# Patient Record
Sex: FEMALE | Race: BLACK OR AFRICAN AMERICAN
[De-identification: names, ages, dates, MRNs, and addresses within clinical notes are randomized per-mention and may not be internally consistent; named-entity substitution may affect disease eponyms.]

---

## 2019-02-01 ENCOUNTER — TRANSCRIPTION ENCOUNTER (OUTPATIENT)
Age: 34
End: 2019-02-01

## 2020-11-04 ENCOUNTER — TRANSCRIPTION ENCOUNTER (OUTPATIENT)
Age: 35
End: 2020-11-04

## 2021-04-14 ENCOUNTER — NON-APPOINTMENT (OUTPATIENT)
Age: 36
End: 2021-04-14

## 2021-04-14 ENCOUNTER — LABORATORY RESULT (OUTPATIENT)
Age: 36
End: 2021-04-14

## 2021-04-14 ENCOUNTER — APPOINTMENT (OUTPATIENT)
Dept: DERMATOLOGY | Facility: CLINIC | Age: 36
End: 2021-04-14
Payer: COMMERCIAL

## 2021-04-14 VITALS — WEIGHT: 147 LBS | BODY MASS INDEX: 21.77 KG/M2 | HEIGHT: 69 IN

## 2021-04-14 DIAGNOSIS — D48.7 NEOPLASM OF UNCERTAIN BEHAVIOR OF OTHER SPECIFIED SITES: ICD-10-CM

## 2021-04-14 DIAGNOSIS — D48.5 NEOPLASM OF UNCERTAIN BEHAVIOR OF SKIN: ICD-10-CM

## 2021-04-14 PROBLEM — Z00.00 ENCOUNTER FOR PREVENTIVE HEALTH EXAMINATION: Status: ACTIVE | Noted: 2021-04-14

## 2021-04-14 PROCEDURE — 99204 OFFICE O/P NEW MOD 45 MIN: CPT | Mod: 25

## 2021-04-14 PROCEDURE — 11104 PUNCH BX SKIN SINGLE LESION: CPT

## 2021-04-14 PROCEDURE — 11105 PUNCH BX SKIN EA SEP/ADDL: CPT

## 2021-04-14 PROCEDURE — 99072 ADDL SUPL MATRL&STAF TM PHE: CPT

## 2021-04-15 ENCOUNTER — TRANSCRIPTION ENCOUNTER (OUTPATIENT)
Age: 36
End: 2021-04-15

## 2021-04-20 ENCOUNTER — NON-APPOINTMENT (OUTPATIENT)
Age: 36
End: 2021-04-20

## 2021-04-20 RX ORDER — KETOCONAZOLE 20.5 MG/ML
2 SHAMPOO, SUSPENSION TOPICAL
Qty: 1 | Refills: 10 | Status: ACTIVE | COMMUNITY
Start: 2021-04-20 | End: 1900-01-01

## 2021-04-20 RX ORDER — KETOCONAZOLE 20 MG/G
2 CREAM TOPICAL
Qty: 1 | Refills: 11 | Status: ACTIVE | COMMUNITY
Start: 2021-04-20 | End: 1900-01-01

## 2021-04-28 ENCOUNTER — APPOINTMENT (OUTPATIENT)
Dept: DERMATOLOGY | Facility: CLINIC | Age: 36
End: 2021-04-28
Payer: COMMERCIAL

## 2021-04-28 PROCEDURE — 99072 ADDL SUPL MATRL&STAF TM PHE: CPT

## 2021-04-28 PROCEDURE — 99214 OFFICE O/P EST MOD 30 MIN: CPT

## 2021-04-28 NOTE — PHYSICAL EXAM
[FreeTextEntry3] : AAOx3, pleasant, NAD, no visual lymphadenopathy\par hair, scalp, face, nose, eyelids, ears, lips, oropharynx, neck, chest, abdomen, back, right arm, left arm, nails, and hands examined with all normal findings,\par pertinent findings include:\par \par dark macules on face, shoulders, and back; significant \par no active areas of erythema

## 2021-04-28 NOTE — ASSESSMENT
[FreeTextEntry1] : spongiotic dermatitis\par bx proven\par sutures removed\par discussed results\par significant PIH; discussed prognosis and sunscreen\par if recurs; will do 2nd course of prednisone\par if recurrent after, consider patch testing and dupixent\par \par patient to call in 2 weeks with update

## 2021-04-28 NOTE — HISTORY OF PRESENT ILLNESS
[FreeTextEntry1] : f/u bx [de-identified] : 35 year old female here for f/u bx proven spongiotic derm. doing well with prednisone. was approved for dupixent.

## 2021-05-26 ENCOUNTER — NON-APPOINTMENT (OUTPATIENT)
Age: 36
End: 2021-05-26

## 2021-06-09 ENCOUNTER — TRANSCRIPTION ENCOUNTER (OUTPATIENT)
Age: 36
End: 2021-06-09

## 2021-06-09 ENCOUNTER — NON-APPOINTMENT (OUTPATIENT)
Age: 36
End: 2021-06-09

## 2021-06-23 ENCOUNTER — APPOINTMENT (OUTPATIENT)
Dept: DERMATOLOGY | Facility: CLINIC | Age: 36
End: 2021-06-23
Payer: COMMERCIAL

## 2021-06-23 VITALS — WEIGHT: 147 LBS | HEIGHT: 68 IN | BODY MASS INDEX: 22.28 KG/M2

## 2021-06-23 PROCEDURE — 99072 ADDL SUPL MATRL&STAF TM PHE: CPT

## 2021-06-23 PROCEDURE — 99214 OFFICE O/P EST MOD 30 MIN: CPT

## 2021-09-15 ENCOUNTER — TRANSCRIPTION ENCOUNTER (OUTPATIENT)
Age: 36
End: 2021-09-15

## 2021-10-25 ENCOUNTER — NON-APPOINTMENT (OUTPATIENT)
Age: 36
End: 2021-10-25

## 2021-10-25 RX ORDER — PREDNISONE 10 MG/1
10 TABLET ORAL
Qty: 20 | Refills: 0 | Status: ACTIVE | COMMUNITY
Start: 2021-10-25 | End: 1900-01-01

## 2022-01-03 ENCOUNTER — TRANSCRIPTION ENCOUNTER (OUTPATIENT)
Age: 37
End: 2022-01-03

## 2022-03-08 ENCOUNTER — TRANSCRIPTION ENCOUNTER (OUTPATIENT)
Age: 37
End: 2022-03-08

## 2022-09-21 ENCOUNTER — APPOINTMENT (OUTPATIENT)
Dept: DERMATOLOGY | Facility: CLINIC | Age: 37
End: 2022-09-21

## 2022-09-21 PROCEDURE — 99214 OFFICE O/P EST MOD 30 MIN: CPT

## 2022-09-21 RX ORDER — PREDNISONE 20 MG/1
20 TABLET ORAL
Qty: 20 | Refills: 0 | Status: ACTIVE | COMMUNITY
Start: 2022-09-21 | End: 1900-01-01

## 2022-10-11 ENCOUNTER — APPOINTMENT (OUTPATIENT)
Dept: ORTHOPEDIC SURGERY | Facility: CLINIC | Age: 37
End: 2022-10-11

## 2022-10-11 VITALS
BODY MASS INDEX: 24.4 KG/M2 | WEIGHT: 161 LBS | DIASTOLIC BLOOD PRESSURE: 94 MMHG | HEART RATE: 92 BPM | SYSTOLIC BLOOD PRESSURE: 137 MMHG | HEIGHT: 68 IN

## 2022-10-11 DIAGNOSIS — Z78.9 OTHER SPECIFIED HEALTH STATUS: ICD-10-CM

## 2022-10-11 DIAGNOSIS — M54.50 LOW BACK PAIN, UNSPECIFIED: ICD-10-CM

## 2022-10-11 DIAGNOSIS — Z82.49 FAMILY HISTORY OF ISCHEMIC HEART DISEASE AND OTHER DISEASES OF THE CIRCULATORY SYSTEM: ICD-10-CM

## 2022-10-11 DIAGNOSIS — Z83.3 FAMILY HISTORY OF DIABETES MELLITUS: ICD-10-CM

## 2022-10-11 DIAGNOSIS — M25.512 PAIN IN LEFT SHOULDER: ICD-10-CM

## 2022-10-11 PROCEDURE — 99203 OFFICE O/P NEW LOW 30 MIN: CPT

## 2022-10-11 PROCEDURE — 73030 X-RAY EXAM OF SHOULDER: CPT | Mod: LT

## 2022-10-11 PROCEDURE — 72100 X-RAY EXAM L-S SPINE 2/3 VWS: CPT

## 2022-10-11 PROCEDURE — 99072 ADDL SUPL MATRL&STAF TM PHE: CPT

## 2022-10-11 RX ORDER — CYCLOBENZAPRINE HYDROCHLORIDE 7.5 MG/1
TABLET, FILM COATED ORAL
Refills: 0 | Status: ACTIVE | COMMUNITY

## 2022-10-13 NOTE — PHYSICAL EXAM
[de-identified] : The patient has no respiratory distress. Mood and affect are normal. The patient is alert and oriented to person, place and time.\par Examination of the cervical spine demonstrates no tenderness, no deformity and no muscle spasm. Cervical spine rotation is 60° to the right, 60° to the left, 75° of extension and 45° of flexion. Neurologic exam of the upper extremities reveals intact sensation to light touch. Motor function is 5 over 5 in all groups. Deep tendon reflexes are 2+ and equal at the biceps, triceps and brachioradialis.\par Examination of the left shoulder demonstrates no deformity. The skin is intact. There is no erythema. There is tenderness anteriorly. Impingement sign is positive There is no instability. Drop arm test is negative. Empty can test is negative. Liftoff test is negative. Chester test is negative.  She has elevation of 150 degrees bilaterally.  She has external rotation of 50 degrees bilaterally and internal rotation to the upper lumbar level bilaterally.  The elbows are stable.  There is no lymphedema.\par Examination of the lumbar spine demonstrates tenderness of the midline. There is no muscle spasm. There is no deformity. Lumbar flexion is 90°, right lateral flexion 10° and left lateral flexion 10°. Straight leg raise test is negative. Lower extremity neurologic exam is intact with regard to sensation, motor function and deep tendon reflexes.  The calves are soft and nontender.  There is no lymphedema. [de-identified] : AP, transscapular and axillary x-rays of the left shoulder demonstrate no fracture, no dislocation and no bony abnormality.  Thoracic scoliosis is seen on the shoulder x-ray.\par AP and lateral x-rays of the lumbar spine demonstrate scoliosis.  There are no fractures or dislocations.
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
## 2022-10-13 NOTE — DISCUSSION/SUMMARY
[de-identified] : The patient has sustained a sprain to her left shoulder and to her lumbar spine.  I have discussed the pathology, natural history and treatment options with her.  She will take ibuprofen for pain.  She will work on home range of motion exercises and ice the affected areas.  If symptomatic in 3 weeks she will be reevaluated.

## 2022-10-20 ENCOUNTER — APPOINTMENT (OUTPATIENT)
Dept: DERMATOLOGY | Facility: CLINIC | Age: 37
End: 2022-10-20

## 2023-03-21 ENCOUNTER — NON-APPOINTMENT (OUTPATIENT)
Age: 38
End: 2023-03-21

## 2023-03-22 ENCOUNTER — APPOINTMENT (OUTPATIENT)
Dept: DERMATOLOGY | Facility: CLINIC | Age: 38
End: 2023-03-22

## 2023-03-22 PROCEDURE — XXXXX: CPT | Mod: 1L

## 2023-03-22 PROCEDURE — 99214 OFFICE O/P EST MOD 30 MIN: CPT | Mod: 1L

## 2023-03-22 RX ORDER — CLOBETASOL PROPIONATE 0.5 MG/G
0.05 OINTMENT TOPICAL TWICE DAILY
Qty: 2 | Refills: 2 | Status: ACTIVE | COMMUNITY
Start: 2022-09-21 | End: 1900-01-01

## 2023-03-22 RX ORDER — TRANEXAMIC ACID
POWDER (GRAM) MISCELLANEOUS
Qty: 30 | Refills: 0 | Status: ACTIVE | COMMUNITY
Start: 2023-03-22 | End: 1900-01-01

## 2023-04-10 RX ORDER — DUPILUMAB 300 MG/2ML
300 INJECTION, SOLUTION SUBCUTANEOUS
Qty: 1 | Refills: 0 | Status: ACTIVE | COMMUNITY
Start: 2021-04-20

## 2023-04-11 ENCOUNTER — APPOINTMENT (OUTPATIENT)
Dept: INTERNAL MEDICINE | Facility: CLINIC | Age: 38
End: 2023-04-11

## 2023-04-11 ENCOUNTER — OUTPATIENT (OUTPATIENT)
Dept: OUTPATIENT SERVICES | Facility: HOSPITAL | Age: 38
LOS: 1 days | End: 2023-04-11

## 2023-04-11 ENCOUNTER — APPOINTMENT (OUTPATIENT)
Dept: DERMATOLOGY | Facility: CLINIC | Age: 38
End: 2023-04-11
Payer: COMMERCIAL

## 2023-04-11 PROCEDURE — 96401 CHEMO ANTI-NEOPL SQ/IM: CPT

## 2023-04-11 PROCEDURE — 99214 OFFICE O/P EST MOD 30 MIN: CPT | Mod: 25

## 2023-04-12 NOTE — PHYSICAL EXAM
[FreeTextEntry3] : Focused skin exam performed with the relevant portions of the exam performed today. \par AAx3, NAD, well-appearing/pleasant.\par Focused examination within normal limits with the exception of:\par \par bilateral anterior thighs clear. \par \par - annular brown and hyperpigmented patches on bilateral lower back\par dark macules on face, upper arms, and back; see photos from 3/22/2023.\par - solitary dark brown plaque at right shin with scale at peripheral edges, see photos from 3/22/2023.\par

## 2023-04-12 NOTE — HISTORY OF PRESENT ILLNESS
[FreeTextEntry1] : f/u spongiotic dermatitis [de-identified] : 37 year old female here for f/u bx proven spongiotic derm s/p prednisone with significant improvement, now here for recurrent flare. \par \par 1. LV 3/22/2023. Flaring on lower legs. Here to start dupixent.\par - Using clobetasol ointment intermittently for itch. \par \par History:\par 3/22/2023: Started flaring in December on legs and spread to arms and trunk. \par Lesions are itchy when they are raised but pruritus stops when they flatten out. \par Started clobetasol ointment at [facility]- only using 2-3 nights in a row and then stops after spots become flat. Currently not using.\par If sx did not resolve within 2 days or spreading, Dr. Sousa prescribed pred 40mg x 4 days, 20mg x 4 days, 10mg x 4 days. Patient took and lesions resolved. \par Patient needed to take prednisone and presents today requesting a refill. \par No preceding medications or infections, no vaccines. Otherwise feels well. \par \par 2. F/u PIH\par - Started compounded tranexamic acid 4%, niacinamide 4%, kojic acid 4% at last visit. Has been applying BID. No results seen  yet. \par

## 2023-04-12 NOTE — ASSESSMENT
[FreeTextEntry1] : 1. Dermatitis, unusual morphology based off previous photos (annular scaly plaques), recurrent today again with annular configuration and one solitary plaque with scale at the periphery on the right shin; with 2. Post-inflammatory hyperpigmentation\par Chronic, mild flare today, mostly with PIH\par - Reviewed photos taken at last visit on 3/22/2023. \par - Biopsy proven spongiotic dermatitis s/p two punch biopsies (left neck and left upper back) by Dr. Antonio Rosales on 04/20/2021. \par - Recalcitrant to multiple courses of prednisone, clobetasol ointment\par - Of note, Dupixent previously approved but Patient did not start after prior flare resolved. \par - Originally, DDX included nummular eczema, figurate erythema, GA and linear IgA. Less likely renu given biopsy findings. \par - Case previously discussed at [facility] where pemphigus foliaceus was considered. Desmogleins not checked. BROOKE + (1:160) with negative SS-A Ab, SS-B Ab. \par - Discussed diagnosis, natural course and treatment plan for disease flares and maintenance strategies to prevent future flares. \par - Treatment options and expectations of treatment discussed. \par - Patient would like to proceed with Dupilumab. \par - Initial: 600 mg dupilumab once (administered as two 300 mg injections), followed by a maintenance dose of 300 mg every other week.\par Medication discussed as well as side effects including conjunctivitis, visual changes, dry eye/itch, hypersensitivity reaction, injection site reactions, herpes simplex infections, eosinophilia, arthralgia/arthritis, keratitis, and molluscum (in peds); discussed avoidance of live vaccines while on the medication but can be considered if needed (discuss with your physician providing the live vaccine)\par - 600mg SC administered into bilateral anterior thighs today without complication. \par - Lot # [id number]\par - Exp: 02/28/2025\par Guided Patient through injection process with 300mg SC injected into R anterior thigh, and Patient administered 300mg SC to her L anterior thigh herself. \par - Patient to administer 300mg SC herself in 2 weeks on Tuesday, 4/25/2023, alternating sites to avoid scar tissue. \par - Patient aware of need to purchase sharps container.\par - Can c/w clobetasol ointment BID to affected area only, no more than 2 weeks, avoid face and groin. r/b/a topical steroids were discussed including but not limited to thinning of the skin, atrophy and dyspigmentation.\par \par 2. For PIH-\par - Discussed nature and course of chronic condition.\par - Discussed sun protective measures, including the daily use and reapplication of broad-spectrum sunscreen with SPF 30+ and the use of sun protective clothing. \par - C/w tranexamic acid 4%, niacinamide 4%, kojic acid 4%, apply to affected areas on body ONCE daily. \par \par RTC on May 10th to f/u with Dr. Wagner Barnes at previously scheduled appointment. Will d/w Dr. Barnes to see if he would like to move appointment to 2-3 months after starting dupi. \par \par \par

## 2023-06-14 ENCOUNTER — APPOINTMENT (OUTPATIENT)
Dept: DERMATOLOGY | Facility: CLINIC | Age: 38
End: 2023-06-14

## 2023-07-13 ENCOUNTER — NON-APPOINTMENT (OUTPATIENT)
Age: 38
End: 2023-07-13

## 2023-08-15 ENCOUNTER — APPOINTMENT (OUTPATIENT)
Dept: DERMATOLOGY | Facility: CLINIC | Age: 38
End: 2023-08-15
Payer: COMMERCIAL

## 2023-08-15 PROCEDURE — 99213 OFFICE O/P EST LOW 20 MIN: CPT

## 2023-08-15 NOTE — HISTORY OF PRESENT ILLNESS
[FreeTextEntry1] : f/u spongiotic dermatitis [de-identified] : 38-year-old female here for f/u bx proven spongiotic derm s/p prednisone with significant improvement, on Dupixent, here for follow-up.  Tolerating Dupixent well, very happy with results. Not flaring.  Noticed two episodes of vertigo since starting. Vertigo experienced ~ 1 week before next injection. Reports waking up in the morning feeling very lightheaded and dizzy, which would last for several days. First episode was in April, second episode was in July. Patient vomited several times during second episode but unsure if related to soup she ate.  Not needing to use clobetasol ointment.  Denies redness at injection sites, s/sx of serum sickness post injections, orolabial HSV, H/A, itchy/watery eyes.   History: 3/22/2023: Started flaring in December on legs and spread to arms and trunk.  Lesions are itchy when they are raised but pruritus stops when they flatten out.  Started clobetasol ointment at - only using 2-3 nights in a row and then stops after spots become flat. Currently not using. If sx did not resolve within 2 days or spreading, Dr. Sousa prescribed pred 40mg x 4 days, 20mg x 4 days, 10mg x 4 days. Patient took and lesions resolved.  Patient needed to take prednisone and presents today requesting a refill.  No preceding medications or infections, no vaccines. Otherwise feels well.

## 2023-08-15 NOTE — ASSESSMENT
[FreeTextEntry1] : 1. Dermatitis, chronic, stable, greatly improved since starting Dupixent 4/11/2023 - Biopsy proven spongiotic dermatitis s/p two punch biopsies (left neck and left upper back) on 04/20/2021.  - Recalcitrant to multiple courses of prednisone, clobetasol ointment - Reviewed photos taken at last visit on 3/22/2023.  - Case previously discussed at [facility] where pemphigus foliaceus was considered. Desmogleins not checked. BROOKE + (1:160) with negative SS-A Ab, SS-B Ab.  - Discussed those two episodes of lightheadedness since April unlikely related to Dupi. Will d/w Dr. Barnes and contact Patient. Contact information provided.  - No conjunctivitis, visual changes, dry eye/itch, hypersensitivity reaction, injection site reactions, herpes simplex infections, eosinophilia, arthralgia/arthritis, keratitis.  - Reminded Patient to discuss avoidance of live vaccines while on the medication but can be considered if needed (discuss with your physician providing the live vaccine). - C/w dupilumab 300 mg SC q2 weeks.   RTC 6 months to f/u with Dr. Barnes.

## 2023-08-15 NOTE — PHYSICAL EXAM
[FreeTextEntry3] : Focused skin exam performed with the relevant portions of the exam performed today.  AAx3, NAD, well-appearing/pleasant. Focused examination within normal limits with the exception of:  - face and trunk clear - solitary dark brown macules and patches on bilateral lower legs, greatly improved compared to photos from 3/22/2023.

## 2024-02-13 NOTE — HISTORY OF PRESENT ILLNESS
[FreeTextEntry1] : f/u spongiotic dermatitis [de-identified] : 38-year-old female last seen August 2023, here for f/u bx proven spongiotic derm s/p prednisone with significant improvement, on Dupixent, here for follow-up.  Tolerating Dupixent well, very happy with results. Not flaring.  Noticed two episodes of vertigo since starting. Vertigo experienced ~ 1 week before next injection. Reports waking up in the morning feeling very lightheaded and dizzy, which would last for several days. First episode was in April, second episode was in July. Patient vomited several times during second episode but unsure if related to soup she ate.  Not needing to use clobetasol ointment.  Denies redness at injection sites, s/sx of serum sickness post injections, orolabial HSV, H/A, itchy/watery eyes.   History: 3/22/2023: Started flaring in December on legs and spread to arms and trunk.  Lesions are itchy when they are raised but pruritus stops when they flatten out.  Started clobetasol ointment at - only using 2-3 nights in a row and then stops after spots become flat. Currently not using. If sx did not resolve within 2 days or spreading, Dr. Sousa prescribed pred 40mg x 4 days, 20mg x 4 days, 10mg x 4 days. Patient took and lesions resolved.  Patient needed to take prednisone and presents today requesting a refill.  No preceding medications or infections, no vaccines. Otherwise feels well.

## 2024-02-16 ENCOUNTER — APPOINTMENT (OUTPATIENT)
Dept: DERMATOLOGY | Facility: CLINIC | Age: 39
End: 2024-02-16

## 2024-02-27 ENCOUNTER — APPOINTMENT (OUTPATIENT)
Dept: DERMATOLOGY | Facility: CLINIC | Age: 39
End: 2024-02-27
Payer: COMMERCIAL

## 2024-02-27 DIAGNOSIS — B00.1 HERPESVIRAL VESICULAR DERMATITIS: ICD-10-CM

## 2024-02-27 DIAGNOSIS — L81.0 POSTINFLAMMATORY HYPERPIGMENTATION: ICD-10-CM

## 2024-02-27 DIAGNOSIS — L30.9 DERMATITIS, UNSPECIFIED: ICD-10-CM

## 2024-02-27 DIAGNOSIS — R21 RASH AND OTHER NONSPECIFIC SKIN ERUPTION: ICD-10-CM

## 2024-02-27 PROCEDURE — 99214 OFFICE O/P EST MOD 30 MIN: CPT

## 2024-02-27 RX ORDER — VALACYCLOVIR 1 G/1
1 TABLET, FILM COATED ORAL
Qty: 12 | Refills: 1 | Status: ACTIVE | COMMUNITY
Start: 2024-02-27 | End: 1900-01-01

## 2024-02-27 RX ORDER — DUPILUMAB 300 MG/2ML
300 INJECTION, SOLUTION SUBCUTANEOUS
Qty: 1 | Refills: 6 | Status: ACTIVE | COMMUNITY
Start: 2021-04-20 | End: 1900-01-01

## 2024-02-27 NOTE — PHYSICAL EXAM
[FreeTextEntry3] : Focused skin exam performed with the relevant portions of the exam performed today.  AAx3, NAD, well-appearing/pleasant. Focused examination within normal limits with the exception of:  - confluent pink papules coalescing into a plaque on the left lateral neck and anterior neck - excoriations on the central back - very faint hyperpigmented macules on bilateral lower legs, tremendously improved compared to photos from 3/22/2023.

## 2024-02-27 NOTE — ASSESSMENT
[FreeTextEntry1] : 1. Contact dermatitis, uncertain etiology, favor allergic, acute flare on the face - Diagnosis reviewed.  - Patient previously experienced episode in April 2021, that resolved with prednisone.  - Hx of patch testing ~ 2020 at outside practice- True test +nickel and thimerosal, however notes that they did not do a full patch testing panel for her at the time because she was so inflamed.  - Gentle skincare reviewed.  - Start prednisone 40 mg x 1 week, 20 mg x 1 week, 10 mg x 1 week - The short- and long-term side effects of systemic steroids were reviewed. Side effects of corticosteroids include but are not limited to: Skin atrophy, stretch marks, delayed wound healing, acne, perioral dermatitis, redness, telangiectasia, and bruising, muscle atrophy, myopathy, osteoporosis, bone necrosis, glaucoma, cataracts, disturbances in mood and behavior, diabetes, adrenal atrophy, growth retardation, delayed puberty, increased sodium retention and potassium excretion, high blood pressure, dyslipidemia, increased risk of infection, gastritis, peptic ulcer, pancreatitis. - Patient to add calcium supplement in addition to the vitamin D that she already takes daily.   2. Dermatitis, chronic, stable, greatly improved since starting Dupixent 4/11/2023 Now with residual 3. Post-inflammatory hyperpigmentation - Biopsy proven spongiotic dermatitis s/p two punch biopsies (left neck and left upper back) on 04/20/2021.  - Recalcitrant to multiple courses of prednisone, clobetasol ointment - Reviewed photos taken at last visit on 3/22/2023.  - Case previously discussed at [facility] where pemphigus foliaceus was considered. Desmogleins not checked. BROOKE + (1:160) with negative SS-A Ab, SS-B Ab.  - C/w dupilumab 300 mg SC q2 weeks.  - Given 1 episode of oral labial HSV, will prescribe Valtrex to take PRN flares.  - No conjunctivitis, visual changes, dry eye/itch, hypersensitivity reaction, injection site reactions, eosinophilia, arthralgia/arthritis, keratitis.  - Reminded Patient to discuss avoidance of live vaccines while on the medication but can be considered if needed (discuss with your physician providing the live vaccine). - Continue photoprotection.    RTC 6 mos

## 2024-02-27 NOTE — HISTORY OF PRESENT ILLNESS
[FreeTextEntry1] : f/u spongiotic dermatitis [de-identified] : 38-year-old female here for f/u bx proven spongiotic derm s/p prednisone with significant improvement, on Dupixent, here for follow-up.   1. New rash on the left lower cheek, spread to rest of neck and face ~ 1 week ago. Itchy, but improved from yesterday with topical benadryl cream. Also itchy on her back.  Nails done a few days before rash started. Otherwise denies any associated triggers.  Reports similar episode several years ago that resolved with prednisone course.  Hx of patch testing ~ 2020 at outside practice- True test +nickel and thimerosal, however notes that they did not do a full patch testing panel for her at the time because she was so inflamed.   2. Also here to f/u for bx-proven spongiotic dermatitis. Tolerating Dupixent well, started April 2023, very happy with results. Not flaring.  One episode of orolabial HSV. No episodes of vertigo after 2 episodes at medication onset.  Not needing to use clobetasol ointment.  Denies redness at injection sites, s/sx of serum sickness post injections, orolabial HSV, H/A, itchy/watery eyes.   History: 3/22/2023: Started flaring in December on legs and spread to arms and trunk.  Lesions are itchy when they are raised but pruritus stops when they flatten out.  Started clobetasol ointment at [facility]- only using 2-3 nights in a row and then stops after spots become flat. Currently not using. If sx did not resolve within 2 days or spreading, Dr. Sousa prescribed pred 40mg x 4 days, 20mg x 4 days, 10mg x 4 days. Patient took and lesions resolved.  Patient needed to take prednisone and presents today requesting a refill.  No preceding medications or infections, no vaccines. Otherwise feels well.

## 2024-03-28 RX ORDER — PREDNISONE 10 MG/1
10 TABLET ORAL
Qty: 49 | Refills: 1 | Status: ACTIVE | COMMUNITY
Start: 2021-04-14 | End: 1900-01-01

## 2024-04-19 ENCOUNTER — NON-APPOINTMENT (OUTPATIENT)
Age: 39
End: 2024-04-19